# Patient Record
Sex: MALE | Race: WHITE | NOT HISPANIC OR LATINO | URBAN - METROPOLITAN AREA
[De-identification: names, ages, dates, MRNs, and addresses within clinical notes are randomized per-mention and may not be internally consistent; named-entity substitution may affect disease eponyms.]

---

## 2017-09-25 ENCOUNTER — EMERGENCY (EMERGENCY)
Facility: HOSPITAL | Age: 37
LOS: 1 days | Discharge: HOME | End: 2017-09-25
Attending: EMERGENCY MEDICINE | Admitting: EMERGENCY MEDICINE
Payer: COMMERCIAL

## 2017-09-25 VITALS
HEART RATE: 69 BPM | DIASTOLIC BLOOD PRESSURE: 70 MMHG | TEMPERATURE: 98 F | RESPIRATION RATE: 18 BRPM | SYSTOLIC BLOOD PRESSURE: 119 MMHG | OXYGEN SATURATION: 99 %

## 2017-09-25 LAB
BASOPHILS # BLD AUTO: 0.03 K/UL — SIGNIFICANT CHANGE UP (ref 0–0.2)
BASOPHILS NFR BLD AUTO: 0.3 % — SIGNIFICANT CHANGE UP (ref 0–2)
BUN SERPL-MCNC: 14 MG/DL — SIGNIFICANT CHANGE UP (ref 7–23)
CALCIUM SERPL-MCNC: 9.3 MG/DL — SIGNIFICANT CHANGE UP (ref 8.4–10.5)
CHLORIDE SERPL-SCNC: 99 MMOL/L — SIGNIFICANT CHANGE UP (ref 98–107)
CO2 SERPL-SCNC: 25 MMOL/L — SIGNIFICANT CHANGE UP (ref 22–31)
CREAT SERPL-MCNC: 1.12 MG/DL — SIGNIFICANT CHANGE UP (ref 0.5–1.3)
EOSINOPHIL # BLD AUTO: 0.11 K/UL — SIGNIFICANT CHANGE UP (ref 0–0.5)
EOSINOPHIL NFR BLD AUTO: 1 % — SIGNIFICANT CHANGE UP (ref 0–6)
GLUCOSE SERPL-MCNC: 89 MG/DL — SIGNIFICANT CHANGE UP (ref 70–99)
GRAM STN WND: SIGNIFICANT CHANGE UP
HCT VFR BLD CALC: 43.6 % — SIGNIFICANT CHANGE UP (ref 39–50)
HGB BLD-MCNC: 14.8 G/DL — SIGNIFICANT CHANGE UP (ref 13–17)
IMM GRANULOCYTES # BLD AUTO: 0.07 # — SIGNIFICANT CHANGE UP
IMM GRANULOCYTES NFR BLD AUTO: 0.7 % — SIGNIFICANT CHANGE UP (ref 0–1.5)
LYMPHOCYTES # BLD AUTO: 1.69 K/UL — SIGNIFICANT CHANGE UP (ref 1–3.3)
LYMPHOCYTES # BLD AUTO: 15.9 % — SIGNIFICANT CHANGE UP (ref 13–44)
MCHC RBC-ENTMCNC: 31.2 PG — SIGNIFICANT CHANGE UP (ref 27–34)
MCHC RBC-ENTMCNC: 33.9 % — SIGNIFICANT CHANGE UP (ref 32–36)
MCV RBC AUTO: 91.8 FL — SIGNIFICANT CHANGE UP (ref 80–100)
MONOCYTES # BLD AUTO: 0.84 K/UL — SIGNIFICANT CHANGE UP (ref 0–0.9)
MONOCYTES NFR BLD AUTO: 7.9 % — SIGNIFICANT CHANGE UP (ref 2–14)
NEUTROPHILS # BLD AUTO: 7.86 K/UL — HIGH (ref 1.8–7.4)
NEUTROPHILS NFR BLD AUTO: 74.2 % — SIGNIFICANT CHANGE UP (ref 43–77)
NRBC # FLD: 0 — SIGNIFICANT CHANGE UP
PLATELET # BLD AUTO: 205 K/UL — SIGNIFICANT CHANGE UP (ref 150–400)
PMV BLD: 10.4 FL — SIGNIFICANT CHANGE UP (ref 7–13)
POTASSIUM SERPL-MCNC: 4.1 MMOL/L — SIGNIFICANT CHANGE UP (ref 3.5–5.3)
POTASSIUM SERPL-SCNC: 4.1 MMOL/L — SIGNIFICANT CHANGE UP (ref 3.5–5.3)
RBC # BLD: 4.75 M/UL — SIGNIFICANT CHANGE UP (ref 4.2–5.8)
RBC # FLD: 12 % — SIGNIFICANT CHANGE UP (ref 10.3–14.5)
SODIUM SERPL-SCNC: 136 MMOL/L — SIGNIFICANT CHANGE UP (ref 135–145)
SPECIMEN SOURCE: SIGNIFICANT CHANGE UP
WBC # BLD: 10.6 K/UL — HIGH (ref 3.8–10.5)
WBC # FLD AUTO: 10.6 K/UL — HIGH (ref 3.8–10.5)

## 2017-09-25 PROCEDURE — 10060 I&D ABSCESS SIMPLE/SINGLE: CPT | Mod: RT

## 2017-09-25 PROCEDURE — 99284 EMERGENCY DEPT VISIT MOD MDM: CPT | Mod: 25

## 2017-09-25 RX ADMIN — Medication 100 MILLIGRAM(S): at 11:29
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
## 2017-09-25 NOTE — ED ADULT NURSE NOTE - OBJECTIVE STATEMENT
Pt rcvd to room 8 c/o R lower leg wound. Pt states wound began apprx 1 week ago. Was prescribed sulfa antibx but feels that wound is getting worse.  R lateral part of lower leg appears like an abscess, reddened, swollen, warm to touch. + mild R foot swelling. + pedal pulse. Denies fever/chills. Evalauted by MD. Labs drawn & sent. 20g IV placed to R ac. Will continue to monitor.

## 2017-09-25 NOTE — ED PROVIDER NOTE - CARE PLAN
Principal Discharge DX:	Abscess  Secondary Diagnosis:	Cellulitis Principal Discharge DX:	Abscess  Instructions for follow-up, activity and diet:	Take all medications as directed, for pain take Ibuprofen (Motrin, Advil) or Acetaminophen (Tylenol) as directed on packaging.  Follow up with ED in 2 days as directed.   If needed call [phone] to find a primary care physician or call  [phone] to schedule an appointment with the general medicine clinic.  Return to the ER for any worsening symptoms or other concerns.  Secondary Diagnosis:	Cellulitis

## 2017-09-25 NOTE — ED PROVIDER NOTE - PMH
Anxiety    S/P ablation of ventricular arrhythmia    S/P ICD (internal cardiac defibrillator) procedure

## 2017-09-25 NOTE — ED PROVIDER NOTE - PLAN OF CARE
Take all medications as directed, for pain take Ibuprofen (Motrin, Advil) or Acetaminophen (Tylenol) as directed on packaging.  Follow up with ED in 2 days as directed.   If needed call [phone] to find a primary care physician or call  [phone] to schedule an appointment with the general medicine clinic.  Return to the ER for any worsening symptoms or other concerns.

## 2017-09-25 NOTE — ED ADULT TRIAGE NOTE - CHIEF COMPLAINT QUOTE
Patient had a staph infection to right lower leg that has been being treated with antibiotics but patient feels that the infection has gotten worse because the area looks worse and he is having pain.

## 2017-09-26 ENCOUNTER — EMERGENCY (EMERGENCY)
Facility: HOSPITAL | Age: 37
LOS: 1 days | Discharge: HOME | End: 2017-09-26
Attending: EMERGENCY MEDICINE | Admitting: EMERGENCY MEDICINE
Payer: COMMERCIAL

## 2017-09-26 VITALS
DIASTOLIC BLOOD PRESSURE: 65 MMHG | RESPIRATION RATE: 16 BRPM | OXYGEN SATURATION: 100 % | HEART RATE: 78 BPM | TEMPERATURE: 98 F | SYSTOLIC BLOOD PRESSURE: 103 MMHG

## 2017-09-26 LAB — SPECIMEN SOURCE: SIGNIFICANT CHANGE UP

## 2017-09-26 PROCEDURE — 99284 EMERGENCY DEPT VISIT MOD MDM: CPT

## 2017-09-26 NOTE — ED PROVIDER NOTE - OBJECTIVE STATEMENT
rt leg swelling.  Patient has been dealing with a cellulitis/abscess of rt shin for days.  Patient started on bactrim. then yesterday switched to clindamycin.  (got I+D yesterday).  Patient also got IV clinda yesterday.  Today foot looked more swollen.  Pain severe this am but resolved qwith 2 motrin.  Original injury related to wrestling.

## 2017-09-26 NOTE — ED PROVIDER NOTE - MEDICAL DECISION MAKING DETAILS
Francia: foot more swollen and red. Patient up standing on it yesterday.  No streaking. no fever or systemic findings. Boggyness reexplored for abcess. minimal pus.  Would continue therapy.

## 2017-09-26 NOTE — ED ADULT TRIAGE NOTE - CHIEF COMPLAINT QUOTE
pt with a c/o cellulitis left lower leg. pt states he was seen in the ed yesterday given iv antibiotics and the symptoms have worsened

## 2017-09-27 LAB
-  CEFAZOLIN: SIGNIFICANT CHANGE UP
-  CIPROFLOXACIN: SIGNIFICANT CHANGE UP
-  CLINDAMYCIN: SIGNIFICANT CHANGE UP
-  ERYTHROMYCIN: SIGNIFICANT CHANGE UP
-  GENTAMICIN: SIGNIFICANT CHANGE UP
-  MOXIFLOXACIN(AEROBIC): SIGNIFICANT CHANGE UP
-  OXACILLIN: SIGNIFICANT CHANGE UP
-  PENICILLIN: SIGNIFICANT CHANGE UP
-  RIFAMPIN.: SIGNIFICANT CHANGE UP
-  TETRACYCLINE: SIGNIFICANT CHANGE UP
-  TRIMETHOPRIM/SULFAMETHOXAZOLE: SIGNIFICANT CHANGE UP
-  VANCOMYCIN: SIGNIFICANT CHANGE UP
BACTERIA WND CULT: SIGNIFICANT CHANGE UP
METHOD TYPE: SIGNIFICANT CHANGE UP
ORGANISM # SPEC MICROSCOPIC CNT: SIGNIFICANT CHANGE UP
ORGANISM # SPEC MICROSCOPIC CNT: SIGNIFICANT CHANGE UP

## 2017-09-27 NOTE — ED POST DISCHARGE NOTE - RESULT SUMMARY
Gram stain wound : Gram pos cocci in clusters and white blood cells. WCX: Staphylococcus aureus prelim. Patient discharged home with a prescription for Clindamycin. Admin P.A. to follow results to final.

## 2018-07-06 ENCOUNTER — APPOINTMENT (OUTPATIENT)
Dept: ELECTROPHYSIOLOGY | Facility: CLINIC | Age: 38
End: 2018-07-06
Payer: COMMERCIAL

## 2018-07-06 PROCEDURE — 93282 PRGRMG EVAL IMPLANTABLE DFB: CPT

## 2018-09-11 ENCOUNTER — NON-APPOINTMENT (OUTPATIENT)
Age: 38
End: 2018-09-11

## 2018-09-11 ENCOUNTER — APPOINTMENT (OUTPATIENT)
Dept: CARDIOLOGY | Facility: CLINIC | Age: 38
End: 2018-09-11
Payer: COMMERCIAL

## 2018-09-11 VITALS
WEIGHT: 132 LBS | DIASTOLIC BLOOD PRESSURE: 71 MMHG | BODY MASS INDEX: 21.21 KG/M2 | RESPIRATION RATE: 16 BRPM | HEIGHT: 66 IN | OXYGEN SATURATION: 99 % | HEART RATE: 54 BPM | SYSTOLIC BLOOD PRESSURE: 106 MMHG

## 2018-09-11 DIAGNOSIS — I51.3 INTRACARDIAC THROMBOSIS, NOT ELSEWHERE CLASSIFIED: ICD-10-CM

## 2018-09-11 DIAGNOSIS — R07.9 CHEST PAIN, UNSPECIFIED: ICD-10-CM

## 2018-09-11 PROCEDURE — 93000 ELECTROCARDIOGRAM COMPLETE: CPT

## 2018-09-11 PROCEDURE — 99215 OFFICE O/P EST HI 40 MIN: CPT

## 2019-11-01 ENCOUNTER — OTHER (OUTPATIENT)
Age: 39
End: 2019-11-01

## 2019-11-01 ENCOUNTER — NON-APPOINTMENT (OUTPATIENT)
Age: 39
End: 2019-11-01

## 2019-11-01 ENCOUNTER — APPOINTMENT (OUTPATIENT)
Dept: ELECTROPHYSIOLOGY | Facility: CLINIC | Age: 39
End: 2019-11-01
Payer: COMMERCIAL

## 2019-11-01 VITALS
HEIGHT: 66 IN | HEART RATE: 65 BPM | BODY MASS INDEX: 22.66 KG/M2 | DIASTOLIC BLOOD PRESSURE: 70 MMHG | OXYGEN SATURATION: 99 % | SYSTOLIC BLOOD PRESSURE: 119 MMHG | WEIGHT: 141 LBS

## 2019-11-01 DIAGNOSIS — I47.2 VENTRICULAR TACHYCARDIA: ICD-10-CM

## 2019-11-01 PROCEDURE — 93282 PRGRMG EVAL IMPLANTABLE DFB: CPT

## 2019-11-01 PROCEDURE — 99215 OFFICE O/P EST HI 40 MIN: CPT

## 2019-11-01 PROCEDURE — 93000 ELECTROCARDIOGRAM COMPLETE: CPT | Mod: 59

## 2019-11-01 PROCEDURE — 93290 INTERROG DEV EVAL ICPMS IP: CPT | Mod: 26

## 2019-11-01 PROCEDURE — ZZZZZ: CPT
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
## 2019-12-10 ENCOUNTER — APPOINTMENT (OUTPATIENT)
Dept: ELECTROPHYSIOLOGY | Facility: CLINIC | Age: 39
End: 2019-12-10

## 2019-12-11 ENCOUNTER — APPOINTMENT (OUTPATIENT)
Dept: ELECTROPHYSIOLOGY | Facility: CLINIC | Age: 39
End: 2019-12-11
Payer: COMMERCIAL

## 2019-12-11 PROCEDURE — 36415 COLL VENOUS BLD VENIPUNCTURE: CPT

## 2019-12-12 LAB
ALBUMIN SERPL ELPH-MCNC: 4.5 G/DL
ALP BLD-CCNC: 64 U/L
ALT SERPL-CCNC: 22 U/L
ANION GAP SERPL CALC-SCNC: 15 MMOL/L
AST SERPL-CCNC: 24 U/L
BASOPHILS # BLD AUTO: 0.04 K/UL
BASOPHILS NFR BLD AUTO: 0.8 %
BILIRUB SERPL-MCNC: 0.3 MG/DL
BUN SERPL-MCNC: 15 MG/DL
CALCIUM SERPL-MCNC: 9.7 MG/DL
CHLORIDE SERPL-SCNC: 101 MMOL/L
CO2 SERPL-SCNC: 26 MMOL/L
CREAT SERPL-MCNC: 1.1 MG/DL
EOSINOPHIL # BLD AUTO: 0.09 K/UL
EOSINOPHIL NFR BLD AUTO: 1.7 %
GLUCOSE SERPL-MCNC: 54 MG/DL
HCT VFR BLD CALC: 46.8 %
HGB BLD-MCNC: 15.1 G/DL
IMM GRANULOCYTES NFR BLD AUTO: 0.4 %
LYMPHOCYTES # BLD AUTO: 1.11 K/UL
LYMPHOCYTES NFR BLD AUTO: 20.9 %
MAN DIFF?: NORMAL
MCHC RBC-ENTMCNC: 30.2 PG
MCHC RBC-ENTMCNC: 32.3 GM/DL
MCV RBC AUTO: 93.6 FL
MONOCYTES # BLD AUTO: 0.51 K/UL
MONOCYTES NFR BLD AUTO: 9.6 %
NEUTROPHILS # BLD AUTO: 3.55 K/UL
NEUTROPHILS NFR BLD AUTO: 66.6 %
PLATELET # BLD AUTO: 250 K/UL
POTASSIUM SERPL-SCNC: 4.8 MMOL/L
PROT SERPL-MCNC: 6.8 G/DL
RBC # BLD: 5 M/UL
RBC # FLD: 12.3 %
SODIUM SERPL-SCNC: 142 MMOL/L
WBC # FLD AUTO: 5.32 K/UL

## 2019-12-18 ENCOUNTER — OUTPATIENT (OUTPATIENT)
Dept: OUTPATIENT SERVICES | Facility: HOSPITAL | Age: 39
LOS: 1 days | Discharge: HOME | End: 2019-12-18
Payer: COMMERCIAL

## 2019-12-18 DIAGNOSIS — Z87.828 PERSONAL HISTORY OF OTHER (HEALED) PHYSICAL INJURY AND TRAUMA: Chronic | ICD-10-CM

## 2019-12-18 DIAGNOSIS — Z95.810 PRESENCE OF AUTOMATIC (IMPLANTABLE) CARDIAC DEFIBRILLATOR: Chronic | ICD-10-CM

## 2019-12-18 DIAGNOSIS — I47.2 VENTRICULAR TACHYCARDIA: ICD-10-CM

## 2019-12-18 PROCEDURE — 33262 RMVL& REPLC PULSE GEN 1 LEAD: CPT

## 2019-12-18 PROCEDURE — 93306 TTE W/DOPPLER COMPLETE: CPT | Mod: 26

## 2019-12-18 RX ORDER — SODIUM CHLORIDE 9 MG/ML
3 INJECTION INTRAMUSCULAR; INTRAVENOUS; SUBCUTANEOUS EVERY 8 HOURS
Refills: 0 | Status: DISCONTINUED | OUTPATIENT
Start: 2019-12-18 | End: 2020-01-03
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
## 2019-12-18 NOTE — CHART NOTE - NSCHARTNOTEFT_GEN_A_CORE
Type of Procedure: Single Chamber Implantable Cardioverter Defibrillator Generator Change  Licensed independent practitioner: Ran Reid MD  Assistant: none  Description of procedure: sterile conditions, antibiotic coverage, old ICD removed, pocket copiously irrigated with antibiotic solution, new ICD implanted and pocket closed in 3 layers.   Findings of procedure: normal pacing, sensing and lead integrity  Estimated blood loss: < 10 cc  Specimen removed: old battery depleted ICD  Preoperative Dx: chronic systolic congestive heart failure; ICD at TERRENCE  Postoperative Dx: chronic systolic congestive heart failure;  ICD at TERRENCE; ICD replaced  Complications: none  Anesthesia type: local anesthesia with sedation  No heparin for 24 hours and then reassess.  NYHA class I; hx of sustained VT (secondary prevention device)    Ran Reid MD.

## 2019-12-18 NOTE — H&P CARDIOLOGY - PMH
Anxiety    S/P ablation of ventricular arrhythmia    S/P ICD (internal cardiac defibrillator) procedure    Thrombus  2011 completed coumadin course  VT (ventricular tachycardia)  2009
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
## 2019-12-18 NOTE — CHART NOTE - NSCHARTNOTEFT_GEN_A_CORE
Patient is s/p ICD generator change. Device teaching given to patient and he demonstrates understanding of the instructions. F/U appointment with device clinic on 1/2/2020 @ 10:45 am

## 2020-01-02 ENCOUNTER — APPOINTMENT (OUTPATIENT)
Dept: ELECTROPHYSIOLOGY | Facility: CLINIC | Age: 40
End: 2020-01-02

## 2020-01-02 PROBLEM — I47.2 VENTRICULAR TACHYCARDIA: Chronic | Status: ACTIVE | Noted: 2019-12-18

## 2020-01-02 PROBLEM — I82.90 ACUTE EMBOLISM AND THROMBOSIS OF UNSPECIFIED VEIN: Chronic | Status: ACTIVE | Noted: 2019-12-18

## 2020-04-07 ENCOUNTER — APPOINTMENT (OUTPATIENT)
Dept: ELECTROPHYSIOLOGY | Facility: CLINIC | Age: 40
End: 2020-04-07

## 2020-05-13 ENCOUNTER — APPOINTMENT (OUTPATIENT)
Dept: ELECTROPHYSIOLOGY | Facility: CLINIC | Age: 40
End: 2020-05-13
Payer: COMMERCIAL

## 2020-05-13 PROCEDURE — 93296 REM INTERROG EVL PM/IDS: CPT

## 2020-05-13 PROCEDURE — 93295 DEV INTERROG REMOTE 1/2/MLT: CPT

## 2020-08-18 ENCOUNTER — APPOINTMENT (OUTPATIENT)
Dept: ELECTROPHYSIOLOGY | Facility: CLINIC | Age: 40
End: 2020-08-18
Payer: COMMERCIAL

## 2020-08-18 PROCEDURE — 93296 REM INTERROG EVL PM/IDS: CPT

## 2020-08-18 PROCEDURE — 93295 DEV INTERROG REMOTE 1/2/MLT: CPT

## 2020-10-20 ENCOUNTER — APPOINTMENT (OUTPATIENT)
Dept: CARDIOLOGY | Facility: CLINIC | Age: 40
End: 2020-10-20

## 2020-10-20 ENCOUNTER — APPOINTMENT (OUTPATIENT)
Dept: ELECTROPHYSIOLOGY | Facility: CLINIC | Age: 40
End: 2020-10-20

## 2020-11-30 ENCOUNTER — APPOINTMENT (OUTPATIENT)
Dept: ELECTROPHYSIOLOGY | Facility: CLINIC | Age: 40
End: 2020-11-30
Payer: COMMERCIAL

## 2020-11-30 PROCEDURE — 93295 DEV INTERROG REMOTE 1/2/MLT: CPT

## 2020-11-30 PROCEDURE — 93296 REM INTERROG EVL PM/IDS: CPT

## 2020-12-08 ENCOUNTER — APPOINTMENT (OUTPATIENT)
Dept: CARDIOLOGY | Facility: CLINIC | Age: 40
End: 2020-12-08

## 2020-12-08 ENCOUNTER — APPOINTMENT (OUTPATIENT)
Dept: ELECTROPHYSIOLOGY | Facility: CLINIC | Age: 40
End: 2020-12-08

## 2021-03-23 ENCOUNTER — FORM ENCOUNTER (OUTPATIENT)
Age: 41
End: 2021-03-23

## 2021-04-05 ENCOUNTER — EMERGENCY (EMERGENCY)
Facility: HOSPITAL | Age: 41
LOS: 1 days | Discharge: HOME | End: 2021-04-05
Attending: EMERGENCY MEDICINE | Admitting: EMERGENCY MEDICINE
Payer: COMMERCIAL

## 2021-04-05 VITALS
HEIGHT: 66 IN | OXYGEN SATURATION: 100 % | HEART RATE: 102 BPM | WEIGHT: 130.07 LBS | TEMPERATURE: 98 F | RESPIRATION RATE: 16 BRPM | DIASTOLIC BLOOD PRESSURE: 80 MMHG | SYSTOLIC BLOOD PRESSURE: 124 MMHG

## 2021-04-05 VITALS
SYSTOLIC BLOOD PRESSURE: 101 MMHG | DIASTOLIC BLOOD PRESSURE: 62 MMHG | HEART RATE: 77 BPM | OXYGEN SATURATION: 98 % | TEMPERATURE: 98 F | RESPIRATION RATE: 16 BRPM

## 2021-04-05 VITALS
SYSTOLIC BLOOD PRESSURE: 131 MMHG | HEART RATE: 69 BPM | HEIGHT: 66 IN | OXYGEN SATURATION: 98 % | DIASTOLIC BLOOD PRESSURE: 59 MMHG | RESPIRATION RATE: 15 BRPM | TEMPERATURE: 98 F

## 2021-04-05 DIAGNOSIS — Z87.828 PERSONAL HISTORY OF OTHER (HEALED) PHYSICAL INJURY AND TRAUMA: Chronic | ICD-10-CM

## 2021-04-05 DIAGNOSIS — Z95.810 PRESENCE OF AUTOMATIC (IMPLANTABLE) CARDIAC DEFIBRILLATOR: Chronic | ICD-10-CM

## 2021-04-05 PROCEDURE — 93010 ELECTROCARDIOGRAM REPORT: CPT

## 2021-04-05 PROCEDURE — 99283 EMERGENCY DEPT VISIT LOW MDM: CPT

## 2021-04-05 PROCEDURE — 93005 ELECTROCARDIOGRAM TRACING: CPT

## 2021-04-05 PROCEDURE — 99284 EMERGENCY DEPT VISIT MOD MDM: CPT

## 2021-04-05 RX ORDER — ALPRAZOLAM 0.25 MG
1 TABLET ORAL
Qty: 9 | Refills: 0
Start: 2021-04-05 | End: 2021-04-07

## 2021-04-05 RX ORDER — ALPRAZOLAM 0.25 MG
0.25 TABLET ORAL ONCE
Refills: 0 | Status: DISCONTINUED | OUTPATIENT
Start: 2021-04-05 | End: 2021-04-05

## 2021-04-05 RX ADMIN — Medication 0.25 MILLIGRAM(S): at 14:55

## 2021-04-05 NOTE — ED ADULT TRIAGE NOTE - CHIEF COMPLAINT QUOTE
pt presents to ED for evaluation of episode of dysphasia at 2 pm today. pt states he was treated and released at [facility] earlier today and upon arrival at home his family members thought he "didn't look right" and was having difficulty speaking. pt endorses not eating properly today, has an ICD placement to left chest wall. denies any current cp sob dizziness blurry vision n+v diarrhea. fingerstick in triage 102.

## 2021-04-05 NOTE — ED PROVIDER NOTE - PROVIDER TOKENS
PROVIDER:[TOKEN:[[id number]],FOLLOWUP:[1-3 Days]],PROVIDER:[TOKEN:[[id number]],FOLLOWUP:[1-3 Days]]

## 2021-04-05 NOTE — ED PROVIDER NOTE - CLINICAL SUMMARY MEDICAL DECISION MAKING FREE TEXT BOX
All symptoms have now resolved and pt is requesting to go home. WIll dc with instructions to follow with crisis center.

## 2021-04-05 NOTE — ED PROVIDER NOTE - PROGRESS NOTE DETAILS
pt felt better and ate part of a sandwich. EKG no significant change from 12/2019.  Copy of old ekg was given to patient.  Girlfriend is present. Offered lab (cbc, comp)  and ct of head, but pt refused and wanted to go home.  Girlfriend was present .

## 2021-04-05 NOTE — ED ADULT NURSE NOTE - OBJECTIVE STATEMENT
patient received to room 25 A&Ox3 ambulatory C/O of 1 episode of aphasia at 2 pm today. seen at [facility] and was told it was a panic attack and given Xanax. patient also reports poor PO intake over the past 24 hours. denies fevers chills nausea vomiting diarrhea CP SOB. PMh AICD. SVT. VSS. awaiting MD evaluation will continue. to monitor.

## 2021-04-05 NOTE — ED PROVIDER NOTE - PATIENT PORTAL LINK FT
You can access the FollowMyHealth Patient Portal offered by [facility] by registering at the following website: [email or web address]. By joining [employer]’s FollowMyHealth portal, you will also be able to view your health information using other applications (apps) compatible with our system.

## 2021-04-05 NOTE — ED PROVIDER NOTE - NSFOLLOWUPINSTRUCTIONS_ED_ALL_ED_FT
Advance activity as tolerated.  Continue all previously prescribed medications as directed unless otherwise instructed.  Follow up with your primary care physician in 48-72 hours- bring copies of your results.  You may also walk into the crisis center for a psychiatric evaluation. Return to the ER for worsening or persistent symptoms, and/or ANY NEW OR CONCERNING SYMPTOMS. If you have issues obtaining follow up, please call: [phone] to obtain a doctor or specialist who takes your insurance in your area.  You may call [phone] to make an appointment with the internal medicine clinic.
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
## 2021-04-05 NOTE — ED ADULT NURSE NOTE - CHIEF COMPLAINT QUOTE
pt presents to ED for evaluation of episode of dysphasia at 2 pm today. pt states he was treated and released at [facility] earlier today and upon arrival at home his family members thought he "didn't look right" and was having difficulty speaking. pt endorses not eating properly today, has an ICD placement to left chest wall. denies any current cp sob dizziness blurry vision n+v diarrhea. fingerstick in triage 102.

## 2021-04-05 NOTE — ED ADULT NURSE NOTE - OBJECTIVE STATEMENT
Patient is 40y male complaining of a fogginess in thinking Patient states that he has anxiety. Patient GF states that he was texting her weird questions and statements around 1330 today. Patient states that he had a headache yesterday and felt foggy as well. Patient denies suicide Idealogy, homicide idealogy audio/visual hallucination

## 2021-04-05 NOTE — ED PROVIDER NOTE - NSFOLLOWUPINSTRUCTIONS_ED_ALL_ED_FT
1.  Take Xanax as prescribed.      [email or web address] CareNotes®     :  [facility]  	                       ANXIETY - AfterCare(R) Instructions(ER/ED)           Anxiety    WHAT YOU NEED TO KNOW:    Anxiety is a condition that causes you to feel extremely worried or nervous. The feelings are so strong that they can cause problems with your daily activities or sleep. Anxiety may be triggered by something you fear, or it may happen without a cause. Family or work stress, smoking, caffeine, and alcohol can increase your risk for anxiety. Certain medicines or health conditions can also increase your risk. Anxiety can become a long-term condition if it is not managed or treated.    DISCHARGE INSTRUCTIONS:    Call your local emergency number (911 in the [place]) if:   •You have chest pain, tightness, or heaviness that may spread to your shoulders, arms, jaw, neck, or back.      •You feel like hurting yourself or someone else.      Call your doctor if:   •Your symptoms get worse or do not get better with treatment.      •Your anxiety keeps you from doing your regular daily activities.      •You have new symptoms since your last visit.      •You have questions or concerns about your condition or care.      Medicines:   •Medicines may be given to help you feel more calm and relaxed, and decrease your symptoms.      •Take your medicine as directed. Contact your healthcare provider if you think your medicine is not helping or if you have side effects. Tell him of her if you are allergic to any medicine. Keep a list of the medicines, vitamins, and herbs you take. Include the amounts, and when and why you take them. Bring the list or the pill bottles to follow-up visits. Carry your medicine list with you in case of an emergency.      Manage anxiety:   •Talk to someone about your anxiety. Your healthcare provider may suggest counseling. Cognitive behavioral therapy can help you understand and change how you react to events that trigger your symptoms. You might feel more comfortable talking with a friend or family member about your anxiety. Choose someone you know will be supportive and encouraging.      •Find ways to relax. Activities such as exercise, meditation, or listening to music can help you relax. Spend time with friends, or do things you enjoy.      •Practice deep breathing. Deep breathing can help you relax when you feel anxious. Focus on taking slow, deep breaths several times a day, or during an anxiety attack. Breathe in through your nose and out through your mouth.      •Create a regular sleep routine. Regular sleep can help you feel calmer during the day. Go to sleep and wake up at the same times every day. Do not watch television or use the computer right before bed. Your room should be comfortable, dark, and quiet.      •Eat a variety of healthy foods. Healthy foods include fruits, vegetables, low-fat dairy products, lean meats, fish, whole-grain breads, and cooked beans. Healthy foods can help you feel less anxious and have more energy.  Healthy Foods           •Exercise regularly. Exercise can increase your energy level. Exercise may also lift your mood and help you sleep better. Your healthcare provider can help you create an exercise plan.  Walking for Exercise           •Do not smoke. Nicotine and other chemicals in cigarettes and cigars can increase anxiety. Ask your healthcare provider for information if you currently smoke and need help to quit. E-cigarettes or smokeless tobacco still contain nicotine. Talk to your healthcare provider before you use these products.      •Do not have caffeine. Caffeine can make your symptoms worse. Do not have foods or drinks that are meant to increase your energy level.      •Limit or do not drink alcohol. Ask your healthcare provider if alcohol is safe for you. You may not be able to drink alcohol if you take certain anxiety or depression medicines. Limit alcohol to 1 drink per day if you are a woman. Limit alcohol to 2 drinks per day if you are a man. A drink of alcohol is 12 ounces of beer, 5 ounces of wine, or 1½ ounces of liquor.      •Do not use drugs. Drugs can make your anxiety worse. It can also make anxiety hard to manage. Talk to your healthcare provider if you use drugs and want help to quit.      Follow up with your doctor within 2 weeks or as directed: Write down your questions so you remember to ask them during your visits.       © Copyright [employer] 2021           back to top                          © Copyright [employer] 2021

## 2021-04-05 NOTE — ED PROVIDER NOTE - CARE PROVIDER_API CALL
Romy Lucas  NEUROLOGY  [address] [place] [address]  Phone: [phone]  Fax: [phone]  Follow Up Time: 1-3 Days    Jorge Alberto Andrade)  Psychiatry  [address] [facility] [place] [address]  Phone: [phone]  Fax: [phone]  Follow Up Time: 1-3 Days
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
## 2021-04-05 NOTE — ED PROVIDER NOTE - PATIENT PORTAL LINK FT
You can access the FollowMyHealth Patient Portal offered by [facility] by registering at the following website: [email or web address]. By joining [employer]’s FollowMyHealth portal, you will also be able to view your health information using other applications (apps) compatible with our system.

## 2021-04-06 PROBLEM — T14.8XXA OTHER INJURY OF UNSPECIFIED BODY REGION, INITIAL ENCOUNTER: Chronic | Status: ACTIVE | Noted: 2021-04-05

## 2021-04-07 ENCOUNTER — APPOINTMENT (OUTPATIENT)
Dept: ELECTROPHYSIOLOGY | Facility: CLINIC | Age: 41
End: 2021-04-07

## 2021-04-16 ENCOUNTER — APPOINTMENT (OUTPATIENT)
Dept: ELECTROPHYSIOLOGY | Facility: CLINIC | Age: 41
End: 2021-04-16

## 2021-06-08 NOTE — ED ADULT TRIAGE NOTE - SPO2 (%)
It was noted by [address] [place] [address] staff that pt self scheduled an appt for himself for this Saturday  The reason for the appt was listed as Chest Pain/skin hot  Pt has had Covid 19 and has been vaccinated  He is not aware of contact with anyone that has Covid 19 currently  I called pt to triage further due to the chest pain  Pt stated the pain was intermittent which began yesterday  I did advise pt to proceed to ED for evaluation but pt did not agree with this plan  Pt preferred to come office for an appt  I was able to schedule pt for a [facility] appt for tomorrow at 11am with MONO De La O  This will be entered in the office schedule tomorrow per protocol  Pt advised if he had any further chest pain to proceed to the nearest ED ASAP for evaluation  Pt verbalizes agreement with this plan 
100

## 2021-10-13 ENCOUNTER — TRANSCRIPTION ENCOUNTER (OUTPATIENT)
Age: 41
End: 2021-10-13

## 2022-08-12 NOTE — ED PROVIDER NOTE - CHIEF COMPLAINT
[Heartburn] : denies heartburn [Nausea] : denies nausea [Vomiting] : denies vomiting [Diarrhea] : denies diarrhea [Constipation] : denies constipation [Yellow Skin Or Eyes (Jaundice)] : denies jaundice [Abdominal Pain] : denies abdominal pain [Abdominal Swelling] : denies abdominal swelling [Rectal Pain] : denies rectal pain [Wt Gain ___ Lbs] : no recent weight gain [Wt Loss ___ Lbs] : no recent weight loss [GERD] : no gastroesophageal reflux disease [Hiatus Hernia] : no hiatus hernia The patient is a 40y Male complaining of see chief complaint quote. [Peptic Ulcer Disease] : no peptic ulcer disease [Pancreatitis] : no pancreatitis [Cholelithiasis] : no cholelithiasis [Kidney Stone] : no kidney stone [Inflammatory Bowel Disease] : no inflammatory bowel disease [Irritable Bowel Syndrome] : no irritable bowel syndrome [Diverticulitis] : no diverticulitis [Alcohol Abuse] : no alcohol abuse [Malignancy] : no malignancy [Abdominal Surgery] : no abdominal surgery [Appendectomy] : no appendectomy [Cholecystectomy] : no cholecystectomy [de-identified] : SAMI GROSSMAN 58 year F with hypertension, HLD and FH of CRC here for a colonoscopy.\par \par Patient states of a good appetite, no loss of weight, bowel movement once daily, formed and brown stools without blood or mucus. Denies abdominal pain, nausea, vomiting, melena, hematochezia, or hematemesis.\par \par 2016 Colonoscopy. Apparently no polyps\par FH: 1st degree or 2nd degree relative with colon cancer: Brother aged 56\par \par  done

## 2024-04-15 ENCOUNTER — APPOINTMENT (OUTPATIENT)
Dept: CARDIOLOGY | Facility: CLINIC | Age: 44
End: 2024-04-15

## 2024-04-22 ENCOUNTER — APPOINTMENT (OUTPATIENT)
Dept: ELECTROPHYSIOLOGY | Facility: CLINIC | Age: 44
End: 2024-04-22

## 2025-07-07 NOTE — ED PROVIDER NOTE - SPECIALTY CARE
Quality 47: Advance Care Plan: Advance Care Planning discussed and documented in the medical record; patient did not wish or was not able to name a surrogate decision maker or provide an advance care plan. Quality 226: Preventive Care And Screening: Tobacco Use: Screening And Cessation Intervention: Patient screened for tobacco use and is an ex/non-smoker Detail Level: Detailed Psychiatry